# Patient Record
Sex: MALE | Race: OTHER | URBAN - METROPOLITAN AREA
[De-identification: names, ages, dates, MRNs, and addresses within clinical notes are randomized per-mention and may not be internally consistent; named-entity substitution may affect disease eponyms.]

---

## 2018-03-18 ENCOUNTER — EMERGENCY (EMERGENCY)
Facility: HOSPITAL | Age: 33
LOS: 0 days | Discharge: HOME | End: 2018-03-18
Attending: EMERGENCY MEDICINE
Payer: SELF-PAY

## 2018-03-18 VITALS
OXYGEN SATURATION: 98 % | SYSTOLIC BLOOD PRESSURE: 129 MMHG | RESPIRATION RATE: 19 BRPM | HEART RATE: 69 BPM | DIASTOLIC BLOOD PRESSURE: 69 MMHG

## 2018-03-18 VITALS
SYSTOLIC BLOOD PRESSURE: 153 MMHG | DIASTOLIC BLOOD PRESSURE: 85 MMHG | RESPIRATION RATE: 18 BRPM | TEMPERATURE: 98 F | HEART RATE: 84 BPM | WEIGHT: 195.99 LBS | OXYGEN SATURATION: 96 % | HEIGHT: 66 IN

## 2018-03-18 DIAGNOSIS — S80.912A UNSPECIFIED SUPERFICIAL INJURY OF LEFT KNEE, INITIAL ENCOUNTER: ICD-10-CM
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
## 2018-03-18 DIAGNOSIS — M25.562 PAIN IN LEFT KNEE: ICD-10-CM
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
## 2018-03-18 LAB
ALBUMIN SERPL ELPH-MCNC: 4.3 G/DL — SIGNIFICANT CHANGE UP (ref 3.3–5)
ALP SERPL-CCNC: 86 U/L — SIGNIFICANT CHANGE UP (ref 40–120)
ALT FLD-CCNC: 32 U/L — SIGNIFICANT CHANGE UP (ref 12–78)
ANION GAP SERPL CALC-SCNC: 7 MMOL/L — SIGNIFICANT CHANGE UP (ref 5–17)
AST SERPL-CCNC: 31 U/L — SIGNIFICANT CHANGE UP (ref 15–37)
BASOPHILS # BLD AUTO: 0.04 K/UL — SIGNIFICANT CHANGE UP (ref 0–0.2)
BASOPHILS NFR BLD AUTO: 0.7 % — SIGNIFICANT CHANGE UP (ref 0–2)
BILIRUB SERPL-MCNC: 0.5 MG/DL — SIGNIFICANT CHANGE UP (ref 0.2–1.2)
BUN SERPL-MCNC: 11 MG/DL — SIGNIFICANT CHANGE UP (ref 7–23)
CALCIUM SERPL-MCNC: 8.6 MG/DL — SIGNIFICANT CHANGE UP (ref 8.5–10.1)
CHLORIDE SERPL-SCNC: 105 MMOL/L — SIGNIFICANT CHANGE UP (ref 96–108)
CO2 SERPL-SCNC: 28 MMOL/L — SIGNIFICANT CHANGE UP (ref 22–31)
CREAT SERPL-MCNC: 1.1 MG/DL — SIGNIFICANT CHANGE UP (ref 0.5–1.3)
EOSINOPHIL # BLD AUTO: 0.06 K/UL — SIGNIFICANT CHANGE UP (ref 0–0.5)
EOSINOPHIL NFR BLD AUTO: 1 % — SIGNIFICANT CHANGE UP (ref 0–6)
GLUCOSE SERPL-MCNC: 99 MG/DL — SIGNIFICANT CHANGE UP (ref 70–99)
HCT VFR BLD CALC: 42.4 % — SIGNIFICANT CHANGE UP (ref 39–50)
HGB BLD-MCNC: 14.5 G/DL — SIGNIFICANT CHANGE UP (ref 13–17)
IMM GRANULOCYTES NFR BLD AUTO: 0.7 % — SIGNIFICANT CHANGE UP (ref 0–1.5)
LYMPHOCYTES # BLD AUTO: 1.65 K/UL — SIGNIFICANT CHANGE UP (ref 1–3.3)
LYMPHOCYTES # BLD AUTO: 27.2 % — SIGNIFICANT CHANGE UP (ref 13–44)
MCHC RBC-ENTMCNC: 27.7 PG — SIGNIFICANT CHANGE UP (ref 27–34)
MCHC RBC-ENTMCNC: 34.2 GM/DL — SIGNIFICANT CHANGE UP (ref 32–36)
MCV RBC AUTO: 80.9 FL — SIGNIFICANT CHANGE UP (ref 80–100)
MONOCYTES # BLD AUTO: 0.43 K/UL — SIGNIFICANT CHANGE UP (ref 0–0.9)
MONOCYTES NFR BLD AUTO: 7.1 % — SIGNIFICANT CHANGE UP (ref 2–14)
NEUTROPHILS # BLD AUTO: 3.84 K/UL — SIGNIFICANT CHANGE UP (ref 1.8–7.4)
NEUTROPHILS NFR BLD AUTO: 63.3 % — SIGNIFICANT CHANGE UP (ref 43–77)
NRBC # BLD: 0 /100 WBCS — SIGNIFICANT CHANGE UP (ref 0–0)
PLATELET # BLD AUTO: 342 K/UL — SIGNIFICANT CHANGE UP (ref 150–400)
POTASSIUM SERPL-MCNC: 3.7 MMOL/L — SIGNIFICANT CHANGE UP (ref 3.5–5.3)
POTASSIUM SERPL-SCNC: 3.7 MMOL/L — SIGNIFICANT CHANGE UP (ref 3.5–5.3)
PROT SERPL-MCNC: 8 GM/DL — SIGNIFICANT CHANGE UP (ref 6–8.3)
RBC # BLD: 5.24 M/UL — SIGNIFICANT CHANGE UP (ref 4.2–5.8)
RBC # FLD: 12.1 % — SIGNIFICANT CHANGE UP (ref 10.3–14.5)
SODIUM SERPL-SCNC: 140 MMOL/L — SIGNIFICANT CHANGE UP (ref 135–145)
WBC # BLD: 6.06 K/UL — SIGNIFICANT CHANGE UP (ref 3.8–10.5)
WBC # FLD AUTO: 6.06 K/UL — SIGNIFICANT CHANGE UP (ref 3.8–10.5)

## 2018-03-18 PROCEDURE — 73700 CT LOWER EXTREMITY W/O DYE: CPT | Mod: 26,LT

## 2018-03-18 PROCEDURE — 99285 EMERGENCY DEPT VISIT HI MDM: CPT

## 2018-03-18 PROCEDURE — 76377 3D RENDER W/INTRP POSTPROCES: CPT | Mod: 26

## 2018-03-18 PROCEDURE — 73564 X-RAY EXAM KNEE 4 OR MORE: CPT | Mod: 26,77,LT

## 2018-03-18 PROCEDURE — 73564 X-RAY EXAM KNEE 4 OR MORE: CPT | Mod: 26,LT

## 2018-03-18 RX ORDER — CEPHALEXIN 500 MG
1 CAPSULE ORAL
Qty: 30 | Refills: 0 | OUTPATIENT
Start: 2018-03-18 | End: 2018-03-27

## 2018-03-18 RX ORDER — CEFAZOLIN SODIUM 1 G
2000 VIAL (EA) INJECTION ONCE
Qty: 0 | Refills: 0 | Status: COMPLETED | OUTPATIENT
Start: 2018-03-18 | End: 2018-03-18

## 2018-03-18 RX ORDER — CEFAZOLIN SODIUM 1 G
1500 VIAL (EA) INJECTION ONCE
Qty: 0 | Refills: 0 | Status: DISCONTINUED | OUTPATIENT
Start: 2018-03-18 | End: 2018-03-18

## 2018-03-18 RX ORDER — OXYCODONE AND ACETAMINOPHEN 5; 325 MG/1; MG/1
1 TABLET ORAL ONCE
Qty: 0 | Refills: 0 | Status: DISCONTINUED | OUTPATIENT
Start: 2018-03-18 | End: 2018-03-18

## 2018-03-18 RX ORDER — MORPHINE SULFATE 50 MG/1
4 CAPSULE, EXTENDED RELEASE ORAL ONCE
Qty: 0 | Refills: 0 | Status: DISCONTINUED | OUTPATIENT
Start: 2018-03-18 | End: 2018-03-18

## 2018-03-18 RX ADMIN — Medication 100 MILLIGRAM(S): at 16:43

## 2018-03-18 RX ADMIN — MORPHINE SULFATE 4 MILLIGRAM(S): 50 CAPSULE, EXTENDED RELEASE ORAL at 16:08

## 2018-03-18 RX ADMIN — MORPHINE SULFATE 4 MILLIGRAM(S): 50 CAPSULE, EXTENDED RELEASE ORAL at 17:22

## 2018-03-18 RX ADMIN — OXYCODONE AND ACETAMINOPHEN 1 TABLET(S): 5; 325 TABLET ORAL at 14:20

## 2018-03-18 RX ADMIN — OXYCODONE AND ACETAMINOPHEN 1 TABLET(S): 5; 325 TABLET ORAL at 15:50

## 2018-03-18 NOTE — ED ADULT NURSE REASSESSMENT NOTE - NS ED NURSE REASSESS COMMENT FT1
pt came with nail to the  left knee Percoset given , pt going to x-ray will assess upon  return pt able to move toe sensation intact

## 2018-03-18 NOTE — ED PROVIDER NOTE - OBJECTIVE STATEMENT
Pt is a 31 yo gentleman with no significant past medical history who presents to the ED with L. knee pain after nail gun shot nail into L. knee 1/2 hr prior to arrival. No numbness or tingling, Up to date on Tdap. No hx of knee surgeries. No other fall, no other injury.

## 2018-03-18 NOTE — CONSULT NOTE ADULT - ASSESSMENT
32M with foreign body to left knee s/p removal in ED  -No free air in left knee joint on CT scan, low suspicion for traumatic arthrotomy  -Pain control  -NWB LLE in knee immobilizer with crutches as needed  -Recommend one dose of IV ancef in ED  -No acute orthopedic surgery intervention indicated at this time  -Follow up with Dr. Mccord within 1 week of discharge from hospital  -D/c with 10 days of PO keflex  -Ortho stable for d/c

## 2018-03-18 NOTE — ED PROVIDER NOTE - PROGRESS NOTE DETAILS
Orthopedics consulted. Confirmed w radiologist no violation of joint space. Distal nail did hit bone. Pt had nail removed and placed in immobilizer by orthopedics. Recommend ancef, keflex 10 days and fu w Dr. Mccord. Nonweightbearing. Pt ok for d.c,

## 2018-03-18 NOTE — ED PROVIDER NOTE - MUSCULOSKELETAL MINIMAL EXAM
l. knee has nail imbedded to head on medial L. knee at joint line. Sensation distally intact, strength and pulses intact. No bleeding.

## 2018-03-18 NOTE — ED PROVIDER NOTE - MEDICAL DECISION MAKING DETAILS
Ddx: concern for joint injury or infection in joint space/ fx  Plan: xrays, ct, pain control, abx, ortho consult

## 2018-03-18 NOTE — ED ADULT NURSE NOTE - OBJECTIVE STATEMENT
nail  by the left kneen happen at work last tetanus shot last 5 month, pt able to move toe +pedal pluse

## 2018-03-18 NOTE — CONSULT NOTE ADULT - SUBJECTIVE AND OBJECTIVE BOX
32M presents to [facility] c/o left knee pain s/p nail gun injury. Pt was working on installing shingles at his home when he accidentally shot a nail into his left knee from a nail gun. Pt denies falling, denies numbness/tingling. No other injuries. Pt able to ambulate however with significant pain afterwards. Pt unsure about tetanus status.     CONSTITUTIONAL: No fever or chills  HEENT:  No headache, no sore throat  RESPIRATORY: No cough, wheezing, or shortness of breath  CARDIOVASCULAR: No chest pain, palpitations, or leg swelling  GASTROINTESTINAL: No nausea, vomiting, or diarrhea  GENITOURINARY: No dysuria, frequency, or hematuria  NEUROLOGICAL: no focal weakness or dizziness  SKIN:  No rashes or lesions   MUSCULOSKELETAL: no myalgias   PSYCHIATRIC: No depression or anxiety    PAST MEDICAL & SURGICAL HISTORY:  Surgery to right foot 6 months ago? Pt unsure of exact procedure    Meds: Denies  All: NKDA    Vital Signs Last 24 Hrs  T(C): 36.6 (18 Mar 2018 15:40), Max: 36.7 (18 Mar 2018 14:02)  T(F): 97.8 (18 Mar 2018 15:40), Max: 98.1 (18 Mar 2018 14:02)  HR: 70 (18 Mar 2018 15:40) (70 - 84)  BP: 109/65 (18 Mar 2018 15:40) (109/65 - 153/85)  BP(mean): --  RR: 18 (18 Mar 2018 15:40) (18 - 18)  SpO2: 96% (18 Mar 2018 15:40) (96% - 96%)    Imaging: XR L knee demonstrates no apparent fractures and nail in medial femur  CT L Knee: Same as above, no free air in knee joint    Physical  Gen: NAD, AAOx3  LLE: +head of nail protruding approx 5mm from anteromedial aspect of distal femur, +mild swelling around nail site, no erythema/ecchymosis, +ttp around nail, no ttp elsewhere, +EHL/FHL/GS/TA, SILT L3-S1, +dp/pt pulse intact, unable to range knee 2/2 pain    Secondary Survey: No TTP over bony prominences, SILT, palpable pulses, full/painless range of motion, compartments soft     Procedure: Patient given IV morphine and under sterile technique, approx 20cc 1% lidocaine injected around nail. Nail was then removed and the wound was copiously irrigated with betadyne and sterile normal saline. Xeroform placed over wound with 4x4, webril and ACE. Post procedure imaging obtained. Pt NVI post procedure, tolerated well.

## 2018-03-18 NOTE — ED ADULT TRIAGE NOTE - CHIEF COMPLAINT QUOTE
Nail accidentally got into left inner knee Nail accidentally got into left inner knee, 1 1/2 inches deep into skin.

## 2018-03-18 NOTE — ED ADULT NURSE NOTE - CAS TRG GENERAL NORM CIRC DET
Strong peripheral pulses obese abdomen loose stools yesterday due to Miralax - pt. called Dr. Elias and she will be taking half the dose pre-op

## 2023-09-27 NOTE — ED ADULT TRIAGE NOTE - LOCATION:
Incoming refill request for: Adderall  Per PDMP last dispensed on: 08/23/2023  Last seen by: Valdemar Parks M.D. on: 04/10/2023  Future appointment to see PCP on: 11/02/2023  Active medication agreement updated on: None on file  Last Drug Screen Collected on: None on file      PDMP review:    Criteria not met because no medication agreement or UDS on file. Forwarded to Physician/JAMILA for further review.      Please advise.   Left arm;